# Patient Record
Sex: MALE | Race: WHITE | NOT HISPANIC OR LATINO
[De-identification: names, ages, dates, MRNs, and addresses within clinical notes are randomized per-mention and may not be internally consistent; named-entity substitution may affect disease eponyms.]

---

## 2021-05-19 ENCOUNTER — RECORDS - HEALTHEAST (OUTPATIENT)
Dept: ADMINISTRATIVE | Facility: OTHER | Age: 46
End: 2021-05-19

## 2021-05-19 ENCOUNTER — OFFICE VISIT - HEALTHEAST (OUTPATIENT)
Dept: FAMILY MEDICINE | Facility: CLINIC | Age: 46
End: 2021-05-19

## 2021-05-19 DIAGNOSIS — E78.00 PURE HYPERCHOLESTEROLEMIA: ICD-10-CM

## 2021-05-19 DIAGNOSIS — K42.9 UMBILICAL HERNIA WITHOUT OBSTRUCTION AND WITHOUT GANGRENE: ICD-10-CM

## 2021-05-19 DIAGNOSIS — Z11.59 ENCOUNTER FOR HEPATITIS C SCREENING TEST FOR LOW RISK PATIENT: ICD-10-CM

## 2021-05-19 DIAGNOSIS — Z13.1 SCREENING FOR DIABETES MELLITUS: ICD-10-CM

## 2021-05-19 DIAGNOSIS — E66.01 SEVERE OBESITY (BMI 35.0-39.9) WITH COMORBIDITY (H): ICD-10-CM

## 2021-05-19 DIAGNOSIS — Z00.00 ROUTINE GENERAL MEDICAL EXAMINATION AT A HEALTH CARE FACILITY: ICD-10-CM

## 2021-05-19 DIAGNOSIS — Z12.11 COLON CANCER SCREENING: ICD-10-CM

## 2021-05-19 LAB
ALT SERPL W P-5'-P-CCNC: 30 U/L (ref 0–45)
ANION GAP SERPL CALCULATED.3IONS-SCNC: 11 MMOL/L (ref 5–18)
BUN SERPL-MCNC: 13 MG/DL (ref 8–22)
CALCIUM SERPL-MCNC: 9.8 MG/DL (ref 8.5–10.5)
CHLORIDE BLD-SCNC: 100 MMOL/L (ref 98–107)
CHOLEST SERPL-MCNC: 289 MG/DL
CO2 SERPL-SCNC: 28 MMOL/L (ref 22–31)
CREAT SERPL-MCNC: 1.23 MG/DL (ref 0.7–1.3)
FASTING STATUS PATIENT QL REPORTED: YES
GFR SERPL CREATININE-BSD FRML MDRD: >60 ML/MIN/1.73M2
GLUCOSE BLD-MCNC: 92 MG/DL (ref 70–125)
HDLC SERPL-MCNC: 46 MG/DL
LDLC SERPL CALC-MCNC: 191 MG/DL
POTASSIUM BLD-SCNC: 4.9 MMOL/L (ref 3.5–5)
SODIUM SERPL-SCNC: 139 MMOL/L (ref 136–145)
TRIGL SERPL-MCNC: 261 MG/DL

## 2021-05-19 ASSESSMENT — MIFFLIN-ST. JEOR: SCORE: 1866.23

## 2021-05-19 NOTE — ASSESSMENT & PLAN NOTE
We reviewed his 10-year heart disease risk based on past data.  Fasting lab work will be completed today.  History of hypertriglyceridemia and in general low HDL cholesterol.  Consider cardiac calcium CT score depending on results of exam today.  Grandparent with MI in 50s (very different lifestyle).

## 2021-05-19 NOTE — ASSESSMENT & PLAN NOTE
Annual exam.  A meeting this patient for the first time.  We discussed metabolic syndrome in the context of central adiposity.  Mood stable.  Sedentary lifestyle.  Colonoscopy referral placed given the revised [facility] TSF guidelines with respect to colon cancer screening.  Fasting lab work today.  He is up-to-date with vaccinations including COVID-19 vaccination.  Follow-up in 1 year, sooner if there is any active treatment for hyperlipidemia.

## 2021-05-19 NOTE — ASSESSMENT & PLAN NOTE
Present on exam.  Consider referral to general surgery at some point.  He is can work on weight loss first.

## 2021-05-19 NOTE — ASSESSMENT & PLAN NOTE
Patient has met criteria over the years for metabolic syndrome.  We discussed this condition in detail.  Screening test will be completed today.  We discussed a low carbohydrate/high protein framework for nutrition as well as some element of time restriction.  He is planning on creasing physical activity as well.

## 2021-05-20 LAB — HCV AB SERPL QL IA: NEGATIVE

## 2021-05-27 VITALS
HEART RATE: 68 BPM | RESPIRATION RATE: 20 BRPM | TEMPERATURE: 97.5 F | DIASTOLIC BLOOD PRESSURE: 62 MMHG | SYSTOLIC BLOOD PRESSURE: 114 MMHG | OXYGEN SATURATION: 99 %

## 2021-05-27 VITALS — HEIGHT: 68 IN

## 2021-05-27 VITALS — WEIGHT: 227 LBS | BODY MASS INDEX: 35.03 KG/M2

## 2021-06-16 PROBLEM — E78.00 HYPERCHOLESTEREMIA: Status: ACTIVE | Noted: 2021-05-19

## 2021-06-16 PROBLEM — K42.9 UMBILICAL HERNIA WITHOUT OBSTRUCTION AND WITHOUT GANGRENE: Status: ACTIVE | Noted: 2021-05-19

## 2021-06-16 PROBLEM — E66.01 SEVERE OBESITY (BMI 35.0-39.9) WITH COMORBIDITY (H): Status: ACTIVE | Noted: 2021-05-19

## 2021-06-16 PROBLEM — Z00.00 ROUTINE GENERAL MEDICAL EXAMINATION AT A HEALTH CARE FACILITY: Status: ACTIVE | Noted: 2021-05-20

## 2021-06-17 NOTE — PROGRESS NOTES
MALE PREVENTATIVE EXAM    Assessment and Plan:     Patient has been advised of split billing requirements and indicates understanding: Yes    Umbilical hernia without obstruction and without gangrene  Present on exam.  Consider referral to general surgery at some point.  He is can work on weight loss first.    Severe obesity (BMI 35.0-39.9) with comorbidity (H)  Patient has met criteria over the years for metabolic syndrome.  We discussed this condition in detail.  Screening test will be completed today.  We discussed a low carbohydrate/high protein framework for nutrition as well as some element of time restriction.  He is planning on creasing physical activity as well.    Hypercholesteremia  We reviewed his 10-year heart disease risk based on past data.  Fasting lab work will be completed today.  History of hypertriglyceridemia and in general low HDL cholesterol.  Consider cardiac calcium CT score depending on results of exam today.  Grandparent with MI in 50s (very different lifestyle).    Routine general medical examination at a health care facility  Annual exam.  A meeting this patient for the first time.  We discussed metabolic syndrome in the context of central adiposity.  Mood stable.  Sedentary lifestyle.  Colonoscopy referral placed given the revised [facility] TSF guidelines with respect to colon cancer screening.  Fasting lab work today.  He is up-to-date with vaccinations including COVID-19 vaccination.  Follow-up in 1 year, sooner if there is any active treatment for hyperlipidemia.    Next follow up:  Return in about 1 week (around 5/26/2021) for Annual physical.    Immunization Review  Adult Imm Review: No immunizations due today  BMI: as above  Pt does not use tobacco products     I discussed the following with the patient:   Adult Healthy Living: Importance of regular exercise  Healthy nutrition  Getting adequate sleep  Herbal medications/alternative medical therapies    I have had an Advance Directives  "discussion with the patient.    Subjective:   Chief Complaint: Tobin Holly is an 45 y.o. male here for a preventative health visit.    Patient has been advised of split billing requirements and indicates understanding: Yes    HPI:  Sedentary job.  Working to improve health. \"I need to drop 50 lbs.\" Prior to COVID-19, he was down to 205.  Has gone up and down. He does not snore.     Healthy Habits  Are you taking a daily aspirin? No  Do you typically exercising at least 40 min, 3-4 times per week?  NO  Do you usually eat at least 4 servings of fruit and vegetables a day, include whole grains and fiber and avoid regularly eating high fat foods? NO - he says that he eats meat, processed foods.  Some veggies.  Scant complex grains.    Have you had an eye exam in the past two years? NO  Do you see a dentist twice per year? NO  Do you have any concerns regarding sleep? No    Safety Screen  If you own firearms, are they secured in a locked gun cabinet or with trigger locks? The patient does not own any firearms  Do you feel you are safe where you are living?: Yes (5/19/2021 11:26 AM)  Do you feel you are safe in your relationship(s)?: Yes (5/19/2021 11:26 AM)      Review of Systems:  Please see above.  The rest of the review of systems are negative for all systems.     Cancer Screening     Patient has no health maintenance due at this time        Patient Care Team:  Donald Jansen MD as PCP - General (Family Medicine)    History     Reviewed By Date/Time Sections Reviewed    Donald Jansen MD 5/20/2021  6:04 AM Medical, Surgical, Tobacco, Alcohol, Drug Use, Sexual Activity, Family    Donald Jansen MD 5/19/2021 12:23 PM Medical, Surgical    Kimber Al LPN 5/19/2021 11:26 AM Tobacco    Kimber Al LPN 5/19/2021 11:24 AM Family    Kimber Al LPN 5/19/2021 11:22 AM Surgical, Tobacco, Alcohol, Drug Use, Sexual Activity        Objective:   Vital Signs:   Visit Vitals  BP " "114/62 (Patient Site: Left Arm, Patient Position: Sitting, Cuff Size: Adult Large)   Pulse 68   Temp 97.5  F (36.4  C) (Oral)   Resp 20   Ht 5' 7.5\" (1.715 m) Comment: with shoes   Wt (!) 227 lb (103 kg)   SpO2 99% Comment: room air   BMI 35.03 kg/m           PHYSICAL EXAM  Physical Exam  Constitutional:       Appearance: He is well-developed.   HENT:      Right Ear: External ear normal.      Left Ear: External ear normal.      Nose: Nose normal.   Eyes:      General:         Right eye: No discharge.         Left eye: No discharge.      Conjunctiva/sclera: Conjunctivae normal.      Pupils: Pupils are equal, round, and reactive to light.   Neck:      Thyroid: No thyromegaly.   Cardiovascular:      Rate and Rhythm: Normal rate and regular rhythm.      Heart sounds: Normal heart sounds. No murmur. No friction rub. No gallop.    Pulmonary:      Effort: Pulmonary effort is normal. No respiratory distress.      Breath sounds: Normal breath sounds. No wheezing or rales.   Abdominal:      General: Bowel sounds are normal. There is no distension.      Palpations: Abdomen is soft.      Tenderness: There is no abdominal tenderness. There is no guarding or rebound.   Musculoskeletal: Normal range of motion.   Lymphadenopathy:      Cervical: No cervical adenopathy.   Skin:     General: Skin is warm and dry.      Findings: No rash.   Neurological:      Mental Status: He is alert.      Cranial Nerves: No cranial nerve deficit.      Deep Tendon Reflexes: Reflexes are normal and symmetric. Reflexes normal.       The 10-year ASCVD risk score ([place] YANET Jr., et al., 2013) is: 4.1%    Values used to calculate the score:      Age: 45 years      Sex: Male      Is Non- : No      Diabetic: No      Tobacco smoker: No      Systolic Blood Pressure: 114 mmHg      Is BP treated: No      HDL Cholesterol: 46 mg/dL      Total Cholesterol: 289 mg/dL       Medication List      as of May 19, 2021 11:59 PM     You have not been " prescribed any medications.         Additional Screenings Completed Today:

## 2021-06-17 NOTE — PATIENT INSTRUCTIONS - HE
Dr. Easton Russell MD   - The Obesity Code   - YouTube    Dr. Nisha Salcido DO.   Search for her name in Youtube with added criteria: Cecilia Rooney      _______________________________    Interested articles on nutrition:    Saturated fat: Saturated Fats and Health: A Reassessment and Proposal for Food-Based Recommendations: JACC State-of-the-Art Review June (https://www.jacc.org/doi/full/10.1016/j.jacc.2020.05.077)    A Pesco-Mediterranean Diet With Intermittent Fasting: JACC Review Topic of the Week (https://www.jacc.org/doi/full/10.1016/j.jacc.2020.07.049)

## 2021-09-19 ENCOUNTER — HEALTH MAINTENANCE LETTER (OUTPATIENT)
Age: 46
End: 2021-09-19

## 2022-01-18 VITALS
WEIGHT: 227 LBS | TEMPERATURE: 97.5 F | RESPIRATION RATE: 20 BRPM | BODY MASS INDEX: 34.4 KG/M2 | HEIGHT: 68 IN | HEART RATE: 68 BPM | SYSTOLIC BLOOD PRESSURE: 114 MMHG | OXYGEN SATURATION: 99 % | DIASTOLIC BLOOD PRESSURE: 62 MMHG

## 2022-06-26 ENCOUNTER — HEALTH MAINTENANCE LETTER (OUTPATIENT)
Age: 47
End: 2022-06-26

## 2022-07-26 ENCOUNTER — NURSE TRIAGE (OUTPATIENT)
Dept: NURSING | Facility: CLINIC | Age: 47
End: 2022-07-26
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
## 2022-07-27 ENCOUNTER — VIRTUAL VISIT (OUTPATIENT)
Dept: FAMILY MEDICINE | Facility: CLINIC | Age: 47
End: 2022-07-27
Payer: COMMERCIAL

## 2022-07-27 DIAGNOSIS — T30.0 BURN: Primary | ICD-10-CM

## 2022-07-27 PROCEDURE — 99213 OFFICE O/P EST LOW 20 MIN: CPT | Mod: 95 | Performed by: FAMILY MEDICINE

## 2022-07-27 RX ORDER — MUPIROCIN CALCIUM 20 MG/G
CREAM TOPICAL 3 TIMES DAILY
COMMUNITY
End: 2023-01-25

## 2022-07-27 RX ORDER — SILVER SULFADIAZINE 10 MG/G
CREAM TOPICAL DAILY
Qty: 50 G | Refills: 1 | Status: SHIPPED | OUTPATIENT
Start: 2022-07-27 | End: 2023-01-25
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
## 2022-11-21 ENCOUNTER — HEALTH MAINTENANCE LETTER (OUTPATIENT)
Age: 47
End: 2022-11-21

## 2022-11-29 ENCOUNTER — TELEPHONE (OUTPATIENT)
Dept: FAMILY MEDICINE | Facility: CLINIC | Age: 47
End: 2022-11-29

## 2022-11-29 DIAGNOSIS — Z12.11 SCREEN FOR COLON CANCER: ICD-10-CM

## 2022-11-29 DIAGNOSIS — E78.00 PURE HYPERCHOLESTEROLEMIA: Primary | ICD-10-CM

## 2022-11-29 NOTE — TELEPHONE ENCOUNTER
Patient calling stating he needs a physical and did not do orders placed last may 2021.  Requesting they be replaced.  Has a physical on 1/25.    States his having some rectal bleeding did not want discuss any further but requested colonoscopy be ordered to have sooner than later.

## 2022-12-19 ENCOUNTER — HOSPITAL ENCOUNTER (OUTPATIENT)
Dept: CT IMAGING | Facility: CLINIC | Age: 47
Discharge: HOME | End: 2022-12-19
Attending: FAMILY MEDICINE | Admitting: FAMILY MEDICINE

## 2022-12-19 DIAGNOSIS — E78.00 PURE HYPERCHOLESTEROLEMIA: ICD-10-CM

## 2022-12-19 LAB
CV CALCIUM SCORE AGATSTON LM: 0
CV CALCIUM SCORING AGATSON LAD: 0
CV CALCIUM SCORING AGATSTON CX: 0
CV CALCIUM SCORING AGATSTON RCA: 0
CV CALCIUM SCORING AGATSTON TOTAL: 0

## 2022-12-19 PROCEDURE — 75571 CT HRT W/O DYE W/CA TEST: CPT

## 2022-12-19 PROCEDURE — 75571 CT HRT W/O DYE W/CA TEST: CPT | Mod: 26 | Performed by: INTERNAL MEDICINE

## 2023-01-24 ASSESSMENT — ENCOUNTER SYMPTOMS
HEMATURIA: 0
HEMATOCHEZIA: 1
CONSTIPATION: 1
DYSURIA: 0
FEVER: 0
MYALGIAS: 0
HEARTBURN: 0
JOINT SWELLING: 0
ARTHRALGIAS: 1
EYE PAIN: 0
COUGH: 0
CHILLS: 0
ABDOMINAL PAIN: 0
SORE THROAT: 0
WEAKNESS: 0
NAUSEA: 0
DIZZINESS: 0
PALPITATIONS: 0
SHORTNESS OF BREATH: 0
PARESTHESIAS: 0
FREQUENCY: 0
DIARRHEA: 0
NERVOUS/ANXIOUS: 0
HEADACHES: 0

## 2023-01-25 ENCOUNTER — OFFICE VISIT (OUTPATIENT)
Dept: FAMILY MEDICINE | Facility: CLINIC | Age: 48
End: 2023-01-25
Payer: COMMERCIAL

## 2023-01-25 VITALS
OXYGEN SATURATION: 98 % | HEIGHT: 67 IN | HEART RATE: 82 BPM | WEIGHT: 217.9 LBS | DIASTOLIC BLOOD PRESSURE: 70 MMHG | SYSTOLIC BLOOD PRESSURE: 108 MMHG | RESPIRATION RATE: 18 BRPM | TEMPERATURE: 98.4 F | BODY MASS INDEX: 34.2 KG/M2

## 2023-01-25 DIAGNOSIS — E66.811 CLASS 1 OBESITY DUE TO EXCESS CALORIES WITH SERIOUS COMORBIDITY AND BODY MASS INDEX (BMI) OF 34.0 TO 34.9 IN ADULT: Primary | ICD-10-CM

## 2023-01-25 DIAGNOSIS — Z12.11 SCREEN FOR COLON CANCER: ICD-10-CM

## 2023-01-25 DIAGNOSIS — Z00.00 ROUTINE GENERAL MEDICAL EXAMINATION AT A HEALTH CARE FACILITY: ICD-10-CM

## 2023-01-25 DIAGNOSIS — E78.00 HYPERCHOLESTEREMIA: ICD-10-CM

## 2023-01-25 DIAGNOSIS — R21 FACIAL RASH: ICD-10-CM

## 2023-01-25 DIAGNOSIS — E66.09 CLASS 1 OBESITY DUE TO EXCESS CALORIES WITH SERIOUS COMORBIDITY AND BODY MASS INDEX (BMI) OF 34.0 TO 34.9 IN ADULT: Primary | ICD-10-CM

## 2023-01-25 PROCEDURE — 99396 PREV VISIT EST AGE 40-64: CPT | Performed by: FAMILY MEDICINE

## 2023-01-25 PROCEDURE — 99213 OFFICE O/P EST LOW 20 MIN: CPT | Mod: 25 | Performed by: FAMILY MEDICINE

## 2023-01-25 RX ORDER — METRONIDAZOLE 7.5 MG/G
GEL TOPICAL 2 TIMES DAILY
Qty: 45 G | Refills: 0 | Status: SHIPPED | OUTPATIENT
Start: 2023-01-25

## 2023-01-25 RX ORDER — TRIAMCINOLONE ACETONIDE 1 MG/G
CREAM TOPICAL 2 TIMES DAILY
Qty: 45 G | Refills: 1 | Status: SHIPPED | OUTPATIENT
Start: 2023-01-25

## 2023-01-25 ASSESSMENT — ENCOUNTER SYMPTOMS
HEADACHES: 0
NAUSEA: 0
CHILLS: 0
COUGH: 0
FEVER: 0
MYALGIAS: 0
HEMATURIA: 0
PALPITATIONS: 0
ARTHRALGIAS: 1
DIZZINESS: 0
ABDOMINAL PAIN: 0
WEAKNESS: 0
NERVOUS/ANXIOUS: 0
FREQUENCY: 0
EYE PAIN: 0
DIARRHEA: 0
HEMATOCHEZIA: 1
PARESTHESIAS: 0
HEARTBURN: 0
SHORTNESS OF BREATH: 0
JOINT SWELLING: 0
DYSURIA: 0
CONSTIPATION: 1
SORE THROAT: 0

## 2023-01-25 NOTE — PROGRESS NOTES
"SUBJECTIVE:   CC: Tobin is an 47 year old who presents for preventative health visit.     Chief Complaint   Patient presents with     Physical     Skin concerns:   - flakey skin   - dry skin   - red spots on face.  Worse in the winter.      - has tried topical antibiotic without improvement.   - face will peel.     Weight:   - he gets stiff when he is seated for a long time   - he has lost weight.  Goal of getting under 200 lbs.   - fast walk on treadmill.  Working on getting back to running.  Will get back to swimming.     - nutrition: \"better diet.\"  More veggies. More fruit.  Working to add greens.  Eliminated chips.  \"more carrots.\"  Protein: through meat (lean).  Breakfast: varies - bagel or poptarts.  He is not hungry.       Patient has been advised of split billing requirements and indicates understanding: Yes  Healthy Habits:     Getting at least 3 servings of Calcium per day:  Yes    Bi-annual eye exam:  NO    Dental care twice a year:  NO    Sleep apnea or symptoms of sleep apnea:  None    Diet:  Regular (no restrictions)    Frequency of exercise:  2-3 days/week    Duration of exercise:  15-30 minutes    Taking medications regularly:  Yes    Medication side effects:  None    PHQ-2 Total Score: 0    Additional concerns today:  No      Today's PHQ-2 Score:   PHQ-2 ( 1999 [employer]) 1/24/2023   Q1: Little interest or pleasure in doing things 0   Q2: Feeling down, depressed or hopeless 0   PHQ-2 Score 0   Q1: Little interest or pleasure in doing things Not at all   Q2: Feeling down, depressed or hopeless Not at all   PHQ-2 Score 0       Social History     Tobacco Use     Smoking status: Never     Passive exposure: Never     Smokeless tobacco: Never   Substance Use Topics     Alcohol use: Yes     Comment: Alcoholic Drinks/day: 5 drinks per week     If you drink alcohol do you typically have >3 drinks per day or >7 drinks per week? No    Alcohol Use 1/25/2023   Prescreen: >3 drinks/day or >7 drinks/week? - " "  Prescreen: >3 drinks/day or >7 drinks/week? No       Last PSA: No results found for: PSA    Reviewed orders with patient. Reviewed health maintenance and updated orders accordingly - Yes    Reviewed and updated as needed this visit by clinical staff   Tobacco  Allergies  Meds  Problems  Med Hx  Surg Hx  Fam Hx          Reviewed and updated as needed this visit by Provider   Tobacco  Allergies  Meds  Problems  Med Hx  Surg Hx  Fam Hx             Review of Systems   Constitutional: Negative for chills and fever.   HENT: Negative for congestion, ear pain, hearing loss and sore throat.    Eyes: Positive for visual disturbance. Negative for pain.   Respiratory: Negative for cough and shortness of breath.    Cardiovascular: Negative for chest pain, palpitations and peripheral edema.   Gastrointestinal: Positive for constipation and hematochezia. Negative for abdominal pain, diarrhea, heartburn and nausea.   Genitourinary: Negative for dysuria, frequency, genital sores, hematuria, impotence, penile discharge and urgency.   Musculoskeletal: Positive for arthralgias. Negative for joint swelling and myalgias.   Skin: Positive for rash.   Neurological: Negative for dizziness, weakness, headaches and paresthesias.   Psychiatric/Behavioral: Negative for mood changes. The patient is not nervous/anxious.      OBJECTIVE:   /70 (BP Location: Left arm, Patient Position: Sitting, Cuff Size: Adult Large)   Pulse 82   Temp 98.4  F (36.9  C) (Oral)   Resp 18   Ht 1.702 m (5' 7\")   Wt 98.8 kg (217 lb 14.4 oz)   SpO2 98%   BMI 34.13 kg/m      Physical Exam  Vitals reviewed.   Constitutional:       General: He is not in acute distress.     Appearance: Normal appearance. He is obese.   HENT:      Head: Normocephalic and atraumatic.      Right Ear: External ear normal.      Left Ear: External ear normal.      Nose: Nose normal.      Mouth/Throat:      Pharynx: No oropharyngeal exudate or posterior oropharyngeal " erythema.   Eyes:      General: No scleral icterus.  Cardiovascular:      Rate and Rhythm: Normal rate and regular rhythm.      Heart sounds: Normal heart sounds. No murmur heard.    No friction rub. No gallop.   Pulmonary:      Effort: Pulmonary effort is normal. No respiratory distress.      Breath sounds: No wheezing.   Abdominal:      General: Bowel sounds are normal. There is no distension.      Palpations: Abdomen is soft. There is no mass.      Tenderness: There is no abdominal tenderness.   Musculoskeletal:         General: No swelling. Normal range of motion.      Cervical back: Normal range of motion.   Skin:     Findings: No rash.   Neurological:      General: No focal deficit present.      Mental Status: He is alert and oriented to person, place, and time.      Cranial Nerves: No cranial nerve deficit.      Deep Tendon Reflexes: Reflexes normal.   Psychiatric:         Mood and Affect: Mood normal.         Behavior: Behavior normal.         Thought Content: Thought content normal.         Judgment: Judgment normal.         Diagnostic Test Results:  Labs reviewed in Epic    ASSESSMENT/PLAN:     Problem List Items Addressed This Visit     Class 1 obesity due to excess calories with serious comorbidity and body mass index (BMI) of 34.0 to 34.9 in adult - Primary     We reviewed nutrition, exercise.  He has been working to improve his nutrition.  Less processed foods.  More vegetables.  Anticipate the stiffness he describes to improve as he is more physically active.         Facial rash     Mild flakiness of skin across the shoulder and flushing of cheeks.  Question of rosacea, dermatitis, eczema, simply dry skin.  For his cheeks, I suggest that he try metronidazole topically.  For his forehead, I think emollients, a topical steroid or a topical fungal is appropriate given his appearance.  Consider referral to dermatology.         Relevant Medications    metroNIDAZOLE (METROGEL) 0.75 % external gel     "triamcinolone (KENALOG) 0.1 % external cream    Hypercholesteremia    Routine general medical examination at a health care facility     Annual exam.  Visual concern as discussed elsewhere.  Overall, he describes his lifestyle as being quite stable.  Declines HIV/hepatitis C screening.  He is scheduled for colonoscopy he later this year.        Other Visit Diagnoses     Screen for colon cancer              Patient has been advised of split billing requirements and indicates understanding: Yes    COUNSELING:   Reviewed preventive health counseling, as reflected in patient instructions       Regular exercise       Healthy diet/nutrition    BMI:   Estimated body mass index is 34.13 kg/m  as calculated from the following:    Height as of this encounter: 1.702 m (5' 7\").    Weight as of this encounter: 98.8 kg (217 lb 14.4 oz).   Weight management plan: Discussed healthy diet and exercise guidelines      He reports that he has never smoked. He has never been exposed to tobacco smoke. He has never used smokeless tobacco.    Donald Jansen MD  [facility]  "

## 2023-01-25 NOTE — PATIENT INSTRUCTIONS
Saturated fat: Saturated Fats and Health: A Reassessment and Proposal for Food-Based Recommendations: [facility] State-of-the-Art Review June (https://www.jacc.org/doi/full/10.1016/j.jacc.2020.05.077)    A Pesco-Mediterranean Diet With Intermittent Fasting: [facility] Review Topic of the Week (https://www.jacc.org/doi/full/10.1016/j.jacc.2020.07.049)

## 2023-01-26 PROBLEM — R21 FACIAL RASH: Status: ACTIVE | Noted: 2023-01-26

## 2023-01-26 NOTE — ASSESSMENT & PLAN NOTE
Mild flakiness of skin across the shoulder and flushing of cheeks.  Question of rosacea, dermatitis, eczema, simply dry skin.  For his cheeks, I suggest that he try metronidazole topically.  For his forehead, I think emollients, a topical steroid or a topical fungal is appropriate given his appearance.  Consider referral to dermatology.

## 2023-01-26 NOTE — ASSESSMENT & PLAN NOTE
Annual exam.  Visual concern as discussed elsewhere.  Overall, he describes his lifestyle as being quite stable.  Declines HIV/hepatitis C screening.  He is scheduled for colonoscopy he later this year.   Bilateral Helical Rim Advancement Flap Text: The defect edges were debeveled with a #15 blade scalpel.  Given the location of the defect and the proximity to free margins (helical rim) a bilateral helical rim advancement flap was deemed most appropriate.  Using a sterile surgical marker, the appropriate advancement flaps were drawn incorporating the defect and placing the expected incisions between the helical rim and antihelix where possible.  The area thus outlined was incised through and through with a #15 scalpel blade.  With a skin hook and iris scissors, the flaps were gently and sharply undermined and freed up.

## 2023-01-26 NOTE — ASSESSMENT & PLAN NOTE
We reviewed nutrition, exercise.  He has been working to improve his nutrition.  Less processed foods.  More vegetables.  Anticipate the stiffness he describes to improve as he is more physically active.

## 2024-01-04 ENCOUNTER — PATIENT OUTREACH (OUTPATIENT)
Dept: CARE COORDINATION | Facility: CLINIC | Age: 49
End: 2024-01-04
Payer: COMMERCIAL

## 2024-01-18 ENCOUNTER — PATIENT OUTREACH (OUTPATIENT)
Dept: CARE COORDINATION | Facility: CLINIC | Age: 49
End: 2024-01-18
Payer: COMMERCIAL

## 2024-04-13 ENCOUNTER — HEALTH MAINTENANCE LETTER (OUTPATIENT)
Age: 49
End: 2024-04-13

## 2025-04-19 ENCOUNTER — HEALTH MAINTENANCE LETTER (OUTPATIENT)
Age: 50
End: 2025-04-19

## 2025-06-23 ENCOUNTER — VIRTUAL VISIT (OUTPATIENT)
Dept: URGENT CARE | Facility: CLINIC | Age: 50
End: 2025-06-23
Payer: COMMERCIAL

## 2025-06-23 DIAGNOSIS — H10.32 ACUTE BACTERIAL CONJUNCTIVITIS OF LEFT EYE: Primary | ICD-10-CM

## 2025-06-23 PROCEDURE — 98005 SYNCH AUDIO-VIDEO EST LOW 20: CPT

## 2025-06-23 RX ORDER — POLYMYXIN B SULFATE AND TRIMETHOPRIM 1; 10000 MG/ML; [USP'U]/ML
1-2 SOLUTION OPHTHALMIC EVERY 4 HOURS
Qty: 10 ML | Refills: 0 | Status: SHIPPED | OUTPATIENT
Start: 2025-06-23 | End: 2025-06-30

## 2025-06-23 NOTE — PATIENT INSTRUCTIONS
Wash hands frequently and avoid touching face to prevent infection affecting the other eye  If the other eye becomes symptomatic, start using the eye drops  Expect improvement in symptoms in 3-4 days, if no improvement return to the clinic for follow up.

## 2025-06-23 NOTE — PROGRESS NOTES
Tobin is a 50 year old male who presents for a billable video visit.    ASSESSMENT/PLAN:  Diagnoses and all orders for this visit:    Acute bacterial conjunctivitis of left eye  -     polymixin b-trimethoprim (POLYTRIM) 84555-0.1 UNIT/ML-% ophthalmic solution; Place 1-2 drops Into the left eye every 4 hours for 7 days.        Follow up with primary care provider with any problems, questions or concerns or if symptoms worsen or fail to improve. Patient agreed to plan and verbalized understanding.     SUBJECTIVE:    Patient reports  pink eye symptoms in the left eye beginning yesterday. Symptoms started after returning from a water park. He noted puffiness, redness, itching, and both purulent and watery discharge from the left eye. Denies contact lens or eyeglass use. No visual disturbances reported.    ROS: Pertinent ROS neg other than the symptoms noted above in the HPI.     OBJECTIVE:  Vitals not done due to this being a virtual visit    GENERAL: healthy, alert and no distress  EYES: Eyes grossly normal to inspection. Left eye conjunctiva injected   RESP: Able to speak in complete sentences, no audible wheeze or cough  SKIN: no suspicious lesions or rashes  NEURO: mentation intact and speech normal  PSYCH: mentation appears normal, affect normal/bright    Video-Visit Details    Type of service:  Video Visit  Video Start Time: 9:02 am  Video End Time: 9:07 am    Originating Location: Home    Distant Location:  [facility]     Platform used for Video Visit: Shelby Strange PA-C